# Patient Record
(demographics unavailable — no encounter records)

---

## 2024-10-08 NOTE — ASSESSMENT
[FreeTextEntry1] : Exam. Reviewed Xrays from 10/4/24 [facility] 3 views of right toes (hallux) with fracture to distal phalanx noted- reviewed and discussed at length with the patient. Applied Elastoplast angel splint to right hallux and 2nd toes, CFTs instantaneous. Instructed patient not to get splint wet and to remove the agnel splint if any numbness, tingling, burning, CFT delay or changes in toe color occur. Instructed the patient to continue to use the fracture shoe to the right foot for ambulation, and instructed the patient not to walk barefoot. Instructed patient to wear supportive sneakers, and not walk barefoot. Instructed patient to rest, ice and elevate the affected areas prn. Instructed patient not to do high impact activity until further notice. Patient demonstrated verbal understanding of all instructions. Patient to return to office in 2 weeks, follow up Xrays will be obtained.

## 2024-10-08 NOTE — PHYSICAL EXAM
[General Appearance - Alert] : alert [General Appearance - In No Acute Distress] : in no acute distress [General Appearance - Well Nourished] : well nourished [de-identified] : Right hallux with mild edema, pain on palpation of right hallux at distal phalanx, with no pain on palpation of right hallux at proximal phalanx, 1st metatarsal, no pain to the rest of the right foot, or to left foot, on palpation.  [FreeTextEntry1] : Protective sensation intact WNL b/l, b/l, light touch intact WNL b/l, Achilles tendon reflex intact b/l.

## 2024-10-08 NOTE — HISTORY OF PRESENT ILLNESS
[FreeTextEntry1] : This 37 year old female patient presents to office today for right big toe fracture. She states 5 days ago, on Thursday, her wooden cutting board fell on her right big toe. She states the toe hurt badly right away and she thought it was broken, so she went to Urgent Care the following morning. Urgent Care took Xrays and confirmed the right big toe is broken. She states she has been following Urgent cares instructions- wearing the fracture shoe and angel taping the right big toe to the right second toe. She states the pain has been mild. She states she take advil prn, not often, for the pain. Patient denies burning, tingling and numbness to her feet.

## 2024-10-22 NOTE — HISTORY OF PRESENT ILLNESS
[FreeTextEntry1] : 37 year old female patient returns today for follow up care of right big toe fracture. She states she wears the fracture shoe whenever she is out, and when she is home she has a supportive shoe for in the house she wears at all times. She states she bought an OTC angel splinter she has been using that has also been helping. She states the area is painful at times, usually after she has been walking a bit. Karen denies burning, tingling and numbness to her feet.

## 2024-10-22 NOTE — PHYSICAL EXAM
[General Appearance - Alert] : alert [General Appearance - In No Acute Distress] : in no acute distress [General Appearance - Well Nourished] : well nourished [de-identified] : Right hallux with mild edema noted, pain on palpation of right hallux at distal phalanx. No pain on palpation of right hallux at proximal phalanx and 1st metatarsal, and no pain to the rest of the right foot, or to left foot, on palpation. Xrays 3 views WB AP, Lat and MO of right foot- oblique fracture to right hallux distal phalanx noted on all views, with bone callus noted the proximal and distal aspects of the fracture, no displacement noted. [FreeTextEntry1] : Protective sensation intact WNL to b/l feet, b/l, light touch intact WNL b/l, Achilles tendon reflex intact b/l.

## 2024-10-22 NOTE — ASSESSMENT
[FreeTextEntry1] : Exam. Xrays obtained as noted above, reviewed and discussed with the patient. Instructed the patient to continue to apply her PTC angel splint to the right big toe and right 2nd toe. Instructed patient not to remove the angel splint if any numbness, tingling, burning, CFT delay or changes in toe color occur. Instructed the patient to continue using the fracture shoe to the right foot for ambulation, and instructed the patient not to walk barefoot. Instructed Karen to rest, ice and elevate the affected areas prn. Instructed the patient not to do high impact activity until further notice. Patient demonstrated verbal understanding of all instructions. Patient to return to office in 2 weeks.

## 2024-11-12 NOTE — PHYSICAL EXAM
[General Appearance - Alert] : alert [General Appearance - In No Acute Distress] : in no acute distress [General Appearance - Well Nourished] : well nourished [de-identified] : Right hallux with mild edema noted, pain on palpation of right hallux at distal phalanx. No pain on palpation of right hallux at proximal phalanx and 1st metatarsal, and no pain to the rest of the right foot, or to left foot, on palpation. Xrays 3 views WB AP, Lat and MO of right foot- oblique fracture to right hallux distal phalanx noted on all views, with bone callus noted to the fracture, with ossification noted the proximal and distal aspects of the fracture, no displacement noted. [FreeTextEntry1] : Protective sensation intact WNL to b/l feet, b/l, light touch intact WNL b/l, Achilles tendon reflex intact b/l.

## 2024-11-12 NOTE — HISTORY OF PRESENT ILLNESS
[FreeTextEntry1] : This 37 year old female patient returns to office for continued care of her right big toe fracture. Patient states she has been wearing supportive shoes, has not been walking barefoot, and the pain has continued to decrease.

## 2024-11-12 NOTE — ASSESSMENT
[FreeTextEntry1] : Examination. Xrays obtained as noted above, and reviewed and discussed with the patient. Applied Elastoplast angel splint to right 1st and 2nd toes, CFTs instantaneous. Instructed patient not to get splint wet and to remove the angel splint if any numbness, tingling, burning, CFT delay or changes in toe color occur. Instructed the patient to wear supportive shoe gear, and to not walk barefoot. Instructed the patient to rest, ice and elevate the affected areas prn. Instructed the patient not to do high impact activity until further notice. Patient demonstrated verbal understanding of all instructions. Patient to return to our office in 2 weeks.

## 2025-05-16 NOTE — HISTORY OF PRESENT ILLNESS
[FreeTextEntry1] : [id number]  DAVE RIBERA  Sep 11 1987 [phone]   here for palpitatoins  Was seen last year. third cardio opinion for cardiac  "burping sensation in chest"  Had similar symptoms 1 yr ago [facility]OT ordered for 30 days pt completed but did not followup as planned. labs ordered, pt did not get these.  Asking for results of testing. Reviewed [facility]OT - during symptoms PVCs sometimes noted. not consistently many symptoms w/ only sinus rhythm one 10 beat run NSVT noted.  episodes lasts seconds. Occurs daily, multiple times a day. sometimes lightheadness/dizziness. Overall worse compared w/ 1 year ago sometimes dyspnea.   No regular exercise. walks up & down stairs w/o problems.  reviewed meds: on metoprolol succ 25 daily symptoms less frequent on this med started by PCP.  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  palpitation symptoms started 3 yrs ago.  no worsening of symptoms w/ exertion.

## 2025-05-16 NOTE — ASSESSMENT
[FreeTextEntry1] : A/P:  *palpitations -Repeat monitoring as symptoms have worsened. -Echo -EST -Labs: Comp, CBC, TSH.  Return in 2 weeks to review results of testing.

## 2025-06-04 NOTE — ASSESSMENT
[FreeTextEntry1] : A/P  *palpitations  -PVCs SVT sometimes nothing. -overall suspect some of her palpitation episodes may be related to PVCs brief SVTs but some are related to anxiety -PVCs, SVT episodes may be triggerred by anxiety.  -diltizam PRN prescribed when she has symptoms. had issues w/ BB in the past...  REturn telehealth in 2 weeks thurs afternoon to review final  results of ziopatch monitor.

## 2025-06-04 NOTE — HISTORY OF PRESENT ILLNESS
[FreeTextEntry1] : [id number]  DAVE RIBERA  Sep 11 1987 [phone]   here for followup for palpitations. pt requested visit May '25 was seen 1 yr ago for similar problem.  reviewed prelim. monitor data during symptoms: PVCs occasionally noted isolated bats. SVT short lasting 3-6 seconds during one episode sometimes no arrhythmias during symptoms.  5/18, 5/20 were worst days pressed button on ziopatch so frequently that a 2nd monitor was mailed to her.  symptoms may be relaited to anxiety symptoms worse after eating pizza.

## 2025-06-04 NOTE — HISTORY OF PRESENT ILLNESS
[FreeTextEntry1] : [id number]  DAVE RIBERA  Sep 11 1987 [phone]   here for followup for palpitations. pt requested visit May '25 was seen 1 yr ago for similar problem.  reviewed prelim. monitor data during symptoms: PVCs occasionally noted isolated bats. SVT short lasting 3-6 seconds during one episode sometimes no arrhythmias during symptoms.  5/18, 5/20 were worst days pressed button on ziopatch so frequently that a 2nd monitor was mailed to her.  symptoms may be relaited to anxiety symptoms worse after eating pizza.

## 2025-06-29 NOTE — HISTORY OF PRESENT ILLNESS
[Home] : at home, [unfilled] , at the time of the visit. [Medical Office: ([facility])___] : at the medical office located in  [Telehealth (audio & video)] : This visit was provided via telehealth using real-time 2-way audio visual technology. [Verbal consent obtained from patient] : the patient, [unfilled] [FreeTextEntry1] : 15 minutes spent on call  [id number]  DAVE RIBERA  Sep 11 1987 [phone]   Reviewed result of ziopatch. sinus rhythm baseline During palpitaiton symptoms sometimes PVCs were noted sometimes normal rhythm no abnormalities

## 2025-06-29 NOTE — HISTORY OF PRESENT ILLNESS
[Home] : at home, [unfilled] , at the time of the visit. [Medical Office: ([facility])___] : at the medical office located in  [Telehealth (audio & video)] : This visit was provided via telehealth using real-time 2-way audio visual technology. [Verbal consent obtained from patient] : the patient, [unfilled] [FreeTextEntry1] : 15 minutes spent on call  [id number]  DAVE RIBERA  Sep 11 1987 [phone]   Reviewed result of ziopatch. sinus rhythm baseline During palpitaiton symptoms sometimes PVCs were noted sometimes normal rhythm no abnormalities

## 2025-06-29 NOTE — ASSESSMENT
[FreeTextEntry1] : A/P:  -Discussed palpitations & PVC during symptoms. PVCs not consistently seen. -reccomend diltiazem PRN for palpitations and also reducing stress as this may be causing  PVCs as well as symptoms.